# Patient Record
Sex: FEMALE | Race: ASIAN | NOT HISPANIC OR LATINO | URBAN - METROPOLITAN AREA
[De-identification: names, ages, dates, MRNs, and addresses within clinical notes are randomized per-mention and may not be internally consistent; named-entity substitution may affect disease eponyms.]

---

## 2021-07-17 ENCOUNTER — INPATIENT (INPATIENT)
Facility: HOSPITAL | Age: 86
LOS: 1 days | Discharge: HOME | End: 2021-07-19
Attending: INTERNAL MEDICINE | Admitting: INTERNAL MEDICINE
Payer: MEDICARE

## 2021-07-17 VITALS
SYSTOLIC BLOOD PRESSURE: 164 MMHG | DIASTOLIC BLOOD PRESSURE: 90 MMHG | HEART RATE: 98 BPM | RESPIRATION RATE: 86 BRPM | OXYGEN SATURATION: 98 % | TEMPERATURE: 98 F

## 2021-07-17 DIAGNOSIS — K21.9 GASTRO-ESOPHAGEAL REFLUX DISEASE WITHOUT ESOPHAGITIS: ICD-10-CM

## 2021-07-17 DIAGNOSIS — R79.89 OTHER SPECIFIED ABNORMAL FINDINGS OF BLOOD CHEMISTRY: ICD-10-CM

## 2021-07-17 DIAGNOSIS — R07.9 CHEST PAIN, UNSPECIFIED: ICD-10-CM

## 2021-07-17 DIAGNOSIS — I20.8 OTHER FORMS OF ANGINA PECTORIS: ICD-10-CM

## 2021-07-17 DIAGNOSIS — Z29.9 ENCOUNTER FOR PROPHYLACTIC MEASURES, UNSPECIFIED: ICD-10-CM

## 2021-07-17 DIAGNOSIS — Z96.659 PRESENCE OF UNSPECIFIED ARTIFICIAL KNEE JOINT: Chronic | ICD-10-CM

## 2021-07-17 DIAGNOSIS — E11.9 TYPE 2 DIABETES MELLITUS WITHOUT COMPLICATIONS: ICD-10-CM

## 2021-07-17 DIAGNOSIS — I10 ESSENTIAL (PRIMARY) HYPERTENSION: ICD-10-CM

## 2021-07-17 DIAGNOSIS — E78.5 HYPERLIPIDEMIA, UNSPECIFIED: ICD-10-CM

## 2021-07-17 LAB
ALBUMIN SERPL ELPH-MCNC: 4.6 G/DL — SIGNIFICANT CHANGE UP (ref 3.3–5)
ALP SERPL-CCNC: 79 U/L — SIGNIFICANT CHANGE UP (ref 40–120)
ALT FLD-CCNC: 57 U/L — HIGH (ref 4–33)
ANION GAP SERPL CALC-SCNC: 16 MMOL/L — HIGH (ref 7–14)
AST SERPL-CCNC: 51 U/L — HIGH (ref 4–32)
BASOPHILS # BLD AUTO: 0.04 K/UL — SIGNIFICANT CHANGE UP (ref 0–0.2)
BASOPHILS NFR BLD AUTO: 0.4 % — SIGNIFICANT CHANGE UP (ref 0–2)
BILIRUB SERPL-MCNC: 0.2 MG/DL — SIGNIFICANT CHANGE UP (ref 0.2–1.2)
BUN SERPL-MCNC: 20 MG/DL — SIGNIFICANT CHANGE UP (ref 7–23)
CALCIUM SERPL-MCNC: 10.2 MG/DL — SIGNIFICANT CHANGE UP (ref 8.4–10.5)
CHLORIDE SERPL-SCNC: 97 MMOL/L — LOW (ref 98–107)
CO2 SERPL-SCNC: 23 MMOL/L — SIGNIFICANT CHANGE UP (ref 22–31)
CREAT SERPL-MCNC: 0.92 MG/DL — SIGNIFICANT CHANGE UP (ref 0.5–1.3)
EOSINOPHIL # BLD AUTO: 0.11 K/UL — SIGNIFICANT CHANGE UP (ref 0–0.5)
EOSINOPHIL NFR BLD AUTO: 1 % — SIGNIFICANT CHANGE UP (ref 0–6)
GLUCOSE SERPL-MCNC: 135 MG/DL — HIGH (ref 70–99)
HCT VFR BLD CALC: 39.5 % — SIGNIFICANT CHANGE UP (ref 34.5–45)
HGB BLD-MCNC: 13 G/DL — SIGNIFICANT CHANGE UP (ref 11.5–15.5)
IANC: 6.55 K/UL — SIGNIFICANT CHANGE UP (ref 1.5–8.5)
IMM GRANULOCYTES NFR BLD AUTO: 0.5 % — SIGNIFICANT CHANGE UP (ref 0–1.5)
LYMPHOCYTES # BLD AUTO: 2.97 K/UL — SIGNIFICANT CHANGE UP (ref 1–3.3)
LYMPHOCYTES # BLD AUTO: 28 % — SIGNIFICANT CHANGE UP (ref 13–44)
MAGNESIUM SERPL-MCNC: 2.1 MG/DL — SIGNIFICANT CHANGE UP (ref 1.6–2.6)
MCHC RBC-ENTMCNC: 29.1 PG — SIGNIFICANT CHANGE UP (ref 27–34)
MCHC RBC-ENTMCNC: 32.9 GM/DL — SIGNIFICANT CHANGE UP (ref 32–36)
MCV RBC AUTO: 88.4 FL — SIGNIFICANT CHANGE UP (ref 80–100)
MONOCYTES # BLD AUTO: 0.88 K/UL — SIGNIFICANT CHANGE UP (ref 0–0.9)
MONOCYTES NFR BLD AUTO: 8.3 % — SIGNIFICANT CHANGE UP (ref 2–14)
NEUTROPHILS # BLD AUTO: 6.55 K/UL — SIGNIFICANT CHANGE UP (ref 1.8–7.4)
NEUTROPHILS NFR BLD AUTO: 61.8 % — SIGNIFICANT CHANGE UP (ref 43–77)
NRBC # BLD: 0 /100 WBCS — SIGNIFICANT CHANGE UP
NRBC # FLD: 0 K/UL — SIGNIFICANT CHANGE UP
NT-PROBNP SERPL-SCNC: 79 PG/ML — SIGNIFICANT CHANGE UP
PLATELET # BLD AUTO: 245 K/UL — SIGNIFICANT CHANGE UP (ref 150–400)
POTASSIUM SERPL-MCNC: 4.6 MMOL/L — SIGNIFICANT CHANGE UP (ref 3.5–5.3)
POTASSIUM SERPL-SCNC: 4.6 MMOL/L — SIGNIFICANT CHANGE UP (ref 3.5–5.3)
PROT SERPL-MCNC: 8 G/DL — SIGNIFICANT CHANGE UP (ref 6–8.3)
RBC # BLD: 4.47 M/UL — SIGNIFICANT CHANGE UP (ref 3.8–5.2)
RBC # FLD: 13.1 % — SIGNIFICANT CHANGE UP (ref 10.3–14.5)
SARS-COV-2 RNA SPEC QL NAA+PROBE: SIGNIFICANT CHANGE UP
SODIUM SERPL-SCNC: 136 MMOL/L — SIGNIFICANT CHANGE UP (ref 135–145)
TROPONIN T, HIGH SENSITIVITY RESULT: 15 NG/L — SIGNIFICANT CHANGE UP
TROPONIN T, HIGH SENSITIVITY RESULT: 16 NG/L — SIGNIFICANT CHANGE UP
WBC # BLD: 10.6 K/UL — HIGH (ref 3.8–10.5)
WBC # FLD AUTO: 10.6 K/UL — HIGH (ref 3.8–10.5)

## 2021-07-17 PROCEDURE — 93010 ELECTROCARDIOGRAM REPORT: CPT

## 2021-07-17 PROCEDURE — 99285 EMERGENCY DEPT VISIT HI MDM: CPT | Mod: CS,25

## 2021-07-17 PROCEDURE — 71045 X-RAY EXAM CHEST 1 VIEW: CPT | Mod: 26

## 2021-07-17 PROCEDURE — 99223 1ST HOSP IP/OBS HIGH 75: CPT

## 2021-07-17 RX ORDER — GLUCAGON INJECTION, SOLUTION 0.5 MG/.1ML
1 INJECTION, SOLUTION SUBCUTANEOUS ONCE
Refills: 0 | Status: DISCONTINUED | OUTPATIENT
Start: 2021-07-17 | End: 2021-07-19

## 2021-07-17 RX ORDER — DEXTROSE 50 % IN WATER 50 %
12.5 SYRINGE (ML) INTRAVENOUS ONCE
Refills: 0 | Status: DISCONTINUED | OUTPATIENT
Start: 2021-07-17 | End: 2021-07-19

## 2021-07-17 RX ORDER — HYDROCHLOROTHIAZIDE 25 MG
25 TABLET ORAL DAILY
Refills: 0 | Status: DISCONTINUED | OUTPATIENT
Start: 2021-07-17 | End: 2021-07-19

## 2021-07-17 RX ORDER — HYDRALAZINE HCL 50 MG
1 TABLET ORAL
Qty: 0 | Refills: 0 | DISCHARGE

## 2021-07-17 RX ORDER — LOVASTATIN 20 MG
1 TABLET ORAL
Qty: 0 | Refills: 0 | DISCHARGE

## 2021-07-17 RX ORDER — VALSARTAN 80 MG/1
160 TABLET ORAL DAILY
Refills: 0 | Status: DISCONTINUED | OUTPATIENT
Start: 2021-07-17 | End: 2021-07-19

## 2021-07-17 RX ORDER — METOPROLOL TARTRATE 50 MG
25 TABLET ORAL DAILY
Refills: 0 | Status: DISCONTINUED | OUTPATIENT
Start: 2021-07-17 | End: 2021-07-19

## 2021-07-17 RX ORDER — METFORMIN HYDROCHLORIDE 850 MG/1
1 TABLET ORAL
Qty: 0 | Refills: 0 | DISCHARGE

## 2021-07-17 RX ORDER — ACETAMINOPHEN 500 MG
650 TABLET ORAL EVERY 6 HOURS
Refills: 0 | Status: DISCONTINUED | OUTPATIENT
Start: 2021-07-17 | End: 2021-07-19

## 2021-07-17 RX ORDER — PANTOPRAZOLE SODIUM 20 MG/1
40 TABLET, DELAYED RELEASE ORAL
Refills: 0 | Status: DISCONTINUED | OUTPATIENT
Start: 2021-07-17 | End: 2021-07-19

## 2021-07-17 RX ORDER — SODIUM CHLORIDE 9 MG/ML
1000 INJECTION, SOLUTION INTRAVENOUS
Refills: 0 | Status: DISCONTINUED | OUTPATIENT
Start: 2021-07-17 | End: 2021-07-19

## 2021-07-17 RX ORDER — METOPROLOL TARTRATE 50 MG
1 TABLET ORAL
Qty: 0 | Refills: 0 | DISCHARGE

## 2021-07-17 RX ORDER — ATORVASTATIN CALCIUM 80 MG/1
20 TABLET, FILM COATED ORAL AT BEDTIME
Refills: 0 | Status: DISCONTINUED | OUTPATIENT
Start: 2021-07-17 | End: 2021-07-19

## 2021-07-17 RX ORDER — DEXTROSE 50 % IN WATER 50 %
15 SYRINGE (ML) INTRAVENOUS ONCE
Refills: 0 | Status: DISCONTINUED | OUTPATIENT
Start: 2021-07-17 | End: 2021-07-19

## 2021-07-17 RX ORDER — HYDRALAZINE HCL 50 MG
50 TABLET ORAL
Refills: 0 | Status: DISCONTINUED | OUTPATIENT
Start: 2021-07-17 | End: 2021-07-19

## 2021-07-17 RX ORDER — ENOXAPARIN SODIUM 100 MG/ML
40 INJECTION SUBCUTANEOUS DAILY
Refills: 0 | Status: DISCONTINUED | OUTPATIENT
Start: 2021-07-17 | End: 2021-07-19

## 2021-07-17 RX ORDER — ASPIRIN/CALCIUM CARB/MAGNESIUM 324 MG
324 TABLET ORAL ONCE
Refills: 0 | Status: COMPLETED | OUTPATIENT
Start: 2021-07-17 | End: 2021-07-17

## 2021-07-17 RX ORDER — ASPIRIN/CALCIUM CARB/MAGNESIUM 324 MG
81 TABLET ORAL DAILY
Refills: 0 | Status: DISCONTINUED | OUTPATIENT
Start: 2021-07-17 | End: 2021-07-19

## 2021-07-17 RX ORDER — INSULIN LISPRO 100/ML
VIAL (ML) SUBCUTANEOUS
Refills: 0 | Status: DISCONTINUED | OUTPATIENT
Start: 2021-07-17 | End: 2021-07-19

## 2021-07-17 RX ORDER — DEXTROSE 50 % IN WATER 50 %
25 SYRINGE (ML) INTRAVENOUS ONCE
Refills: 0 | Status: DISCONTINUED | OUTPATIENT
Start: 2021-07-17 | End: 2021-07-19

## 2021-07-17 RX ORDER — OMEPRAZOLE 10 MG/1
1 CAPSULE, DELAYED RELEASE ORAL
Qty: 0 | Refills: 0 | DISCHARGE

## 2021-07-17 RX ORDER — ACETAMINOPHEN 500 MG
650 TABLET ORAL ONCE
Refills: 0 | Status: COMPLETED | OUTPATIENT
Start: 2021-07-17 | End: 2021-07-17

## 2021-07-17 RX ADMIN — ATORVASTATIN CALCIUM 20 MILLIGRAM(S): 80 TABLET, FILM COATED ORAL at 20:01

## 2021-07-17 RX ADMIN — Medication 324 MILLIGRAM(S): at 12:59

## 2021-07-17 RX ADMIN — Medication 650 MILLIGRAM(S): at 12:59

## 2021-07-17 RX ADMIN — Medication 50 MILLIGRAM(S): at 19:06

## 2021-07-17 NOTE — ED PROVIDER NOTE - ATTENDING CONTRIBUTION TO CARE
I performed a history and physical exam of the patient and discussed their management with the resident and /or advanced care provider. I reviewed the resident and /or ACP's note and agree with the documented findings and plan of care. My medical decision making and observations are found above.  Lungs clear, abd distended, non tender.

## 2021-07-17 NOTE — ED PROVIDER NOTE - CLINICAL SUMMARY MEDICAL DECISION MAKING FREE TEXT BOX
Cassandra: chest pain this am. No MI hx. EKG ok. will check troponin. Alos hx of HA and dizziness with standing for months. Will check DM and Cr as well as hct for cause of weakness (general) and pain. Age makes her higher risk. Cassandra: chest pain this am. No MI hx. EKG ok. will check troponin. Alos hx of HA and dizziness with standing for months. Will check DM and Cr as well as hct for cause of weakness (general) and pain. Age makes her higher risk.    Alex: Pt is a 85 y/o female c/o 4 hour history of 8/10, sharp substernal chest pain with radiation to the left axillary region. Pt also has a history of headaches and dizziness with standing for 4 months. Trops and BNP workup to rule out cardiac causes of chest pain.

## 2021-07-17 NOTE — ED ADULT NURSE NOTE - OBJECTIVE STATEMENT
pt alert,oriented x 3.  Bulgarian speaking as per son  pt c/o ch pains since this am. reports diff breathing upon changing position. denies cough,fever. iv access,labs sent. will continue to monitor.

## 2021-07-17 NOTE — H&P ADULT - NSHPREVIEWOFSYSTEMS_GEN_ALL_CORE
CONSTITUTIONAL: No fever, weight loss, or fatigue  EYES: No eye pain, visual disturbances, or discharge  ENMT:  No difficulty hearing, tinnitus, vertigo; No sinus or throat pain  NECK: No pain or stiffness  BREASTS: No pain, masses, or nipple discharge  RESPIRATORY: No cough, wheezing, chills or hemoptysis; + shortness of breath  CARDIOVASCULAR: + chest pain, no palpitations, + dizziness, no leg swelling  GASTROINTESTINAL: No abdominal or epigastric pain. No nausea, vomiting, or hematemesis; No diarrhea or constipation. No melena or hematochezia.  GENITOURINARY: No dysuria, frequency, hematuria, or incontinence  NEUROLOGICAL: No headaches, memory loss, loss of strength, numbness, or tremors  SKIN: No itching, burning, rashes, or lesions   LYMPH NODES: No enlarged glands  ENDOCRINE: No heat or cold intolerance; No hair loss  MUSCULOSKELETAL: No muscle or back pain, +arthritis  PSYCHIATRIC: No depression, anxiety, mood swings, or difficulty sleeping  HEME/LYMPH: No easy bruising, or bleeding gums  ALLERGY AND IMMUNOLOGIC: No hives or eczema

## 2021-07-17 NOTE — ED ADULT TRIAGE NOTE - CHIEF COMPLAINT QUOTE
pt c/o chest pain x 1 day, radiating to the back. denies SOB. #22s/l placed to R wrist and 325 asa given by EMS. pt dm2

## 2021-07-17 NOTE — H&P ADULT - PROBLEM SELECTOR PLAN 5
c/w statin , takes lovastatin 10mg hs at home  lipitor here mild transaminitis likely d/t fatty liver (h/o hld), AST/ALT 51/57  no RUQ abd pain, T. bili normal  trend LFT, outpt f/u PCP

## 2021-07-17 NOTE — H&P ADULT - HISTORY OF PRESENT ILLNESS
Patient is a 87 y/o Wolof speaking female with h/o HTN, DM-2, HLD, GERD, who presents with left sided substernal chest pain this morning, associated with sob, some radiation to left axillary region. Patient denies fever/chill/ abd pain/urinary symptoms/cough/nausea/vomiting/diaphoresis. Chest pain lasted for about 30 minutes. Also c/o dizziness for the past few days.   In ED, EKG LVH, no ischemic ST/T changes.  History obtained from patient with the help of her son at bedside.

## 2021-07-17 NOTE — ED ADULT NURSE NOTE - NSIMPLEMENTINTERV_GEN_ALL_ED
Implemented All Fall with Harm Risk Interventions:  [place] to call system. Call bell, personal items and telephone within reach. Instruct patient to call for assistance. Room bathroom lighting operational. Non-slip footwear when patient is off stretcher. Physically safe environment: no spills, clutter or unnecessary equipment. Stretcher in lowest position, wheels locked, appropriate side rails in place. Provide visual cue, wrist band, yellow gown, etc. Monitor gait and stability. Monitor for mental status changes and reorient to person, place, and time. Review medications for side effects contributing to fall risk. Reinforce activity limits and safety measures with patient and family. Provide visual clues: red socks.

## 2021-07-17 NOTE — ED PROVIDER NOTE - OBJECTIVE STATEMENT
Pt is a 87 y/o female c/o 4 hour history of 8/10, sharp substernal chest pain with radiation to the left axillary region. Pt was waking up when the pain began. Pt has a history of headaches and vertigo. Denies fevers, chills, nausea, vomiting, diarrhea, constipation, and abdominal pain.

## 2021-07-17 NOTE — H&P ADULT - NSICDXPASTMEDICALHX_GEN_ALL_CORE_FT
PAST MEDICAL HISTORY:  Diabetes mellitus     GERD (gastroesophageal reflux disease)     HLD (hyperlipidemia)     HTN (hypertension)     Osteoarthritis

## 2021-07-17 NOTE — H&P ADULT - NSHPLABSRESULTS_GEN_ALL_CORE
LABS:                        13.0   10.60 )-----------( 245      ( 17 Jul 2021 12:52 )             39.5     07-17    136  |  97<L>  |  20  ----------------------------<  135<H>  4.6   |  23  |  0.92    Ca    10.2      17 Jul 2021 12:53  Phos  2.6     07-17  Mg     2.10     07-17    TPro  8.0  /  Alb  4.6  /  TBili  0.2  /  DBili  x   /  AST  51<H>  /  ALT  57<H>  /  AlkPhos  79  07-17      EKG reviewed: NSR, LVH, no ischemic ST/T change      RADIOLOGY & ADDITIONAL TESTS:  < from: Xray Chest 1 View- PORTABLE-Urgent (07.17.21 @ 12:33) >    IMPRESSION:  Elevated right hemidiaphragm.    Clear lungs. No pleural effusions or pneumothorax.    Slightly prominent appearing cardiac and mediastinal silhouettes however inaccurately assessed on the projection. Scant aortic arch calcifications.    Trachea midline.    Generalized mild osteopenia.

## 2021-07-17 NOTE — H&P ADULT - PROBLEM/PLAN-1
----- Message from Ele Lau sent at 10/9/2020  1:53 PM CDT -----  Regarding: PT  Contact: PT's Mother Cinda  PT's mother called to see if the doctor had anything available [facility]. The PT has a doctor on the [facility] but they're booked out for a long time and her father sees Dr Mulligan so her mother decided to call and see if maybe she could see her too. Please call back     Callback: [phone]     
Scheduled appt with dipp  
DISPLAY PLAN FREE TEXT

## 2021-07-17 NOTE — ED PROVIDER NOTE - NS ED ROS FT
CONSTITUTIONAL: No fever, weight loss, or fatigue  EYES: No eye pain, visual disturbances, or discharge  ENMT:  +Headaches, No difficulty hearing, tinnitus, vertigo; No sinus or throat pain  NECK: No pain or stiffness  RESPIRATORY: +Shortness of breath, No cough, wheezing, chills or hemoptysis  CARDIOVASCULAR: + Chest pain, palpitations, dizziness, or leg swelling  GASTROINTESTINAL: No abdominal or epigastric pain. No nausea, vomiting, or hematemesis; No diarrhea or constipation. No melena or hematochezia.  GENITOURINARY: No dysuria, frequency, hematuria, or incontinence  NEUROLOGICAL: No headaches, memory loss, loss of strength, numbness, or tremors  SKIN: No itching, burning, rashes, or lesions

## 2021-07-17 NOTE — H&P ADULT - NSHPPHYSICALEXAM_GEN_ALL_CORE
Vital Signs Last 24 Hrs  T(C): 36.7 (17 Jul 2021 11:31), Max: 36.7 (17 Jul 2021 11:31)  T(F): 98 (17 Jul 2021 11:31), Max: 98 (17 Jul 2021 11:31)  HR: 70 (17 Jul 2021 15:20) (70 - 98)  BP: 146/79 (17 Jul 2021 15:20) (146/79 - 164/90)  BP(mean): --  RR: 18 (17 Jul 2021 15:20) (16 - 18)  SpO2: 100% (17 Jul 2021 12:27) (98% - 100%)  CAPILLARY BLOOD GLUCOSE      POCT Blood Glucose.: 176 mg/dL (17 Jul 2021 11:34)    I&O's Summary      PHYSICAL EXAM:  GENERAL: NAD, well-developed  HEAD:  Atraumatic, Normocephalic  EYES: EOMI, conjunctiva and sclera clear  NECK: Supple, No JVD  CHEST/LUNG: Clear to auscultation bilaterally; No wheeze  HEART: Regular rate and rhythm; No murmurs, rubs, or gallops  ABDOMEN: Soft, Nontender, Nondistended; Bowel sounds present  EXTREMITIES:  2+ Peripheral Pulses, No clubbing, cyanosis, or edema, surgical scars from b/l TKR  PSYCH: AAOx3  NEUROLOGY: non-focal  SKIN: No rashes or lesions

## 2021-07-17 NOTE — H&P ADULT - PROBLEM SELECTOR PLAN 1
-substernal chest pain with sob, dizziness, left axilla region radiation concerning for underlying ischemic heart disease  -EKG LVH, NSR, no ischemic ST/T changes  -ruled out for ACS with negative troponins x2 (15--16), pro-BNP nml 79  -check TTE  -consider ischemic eval with stress test (inpt vs. outpt)  -start ASA, c/w statin, beta blocker  -I notified cardiologist Dr. West who will see him -substernal chest pain with sob, dizziness, left axilla region radiation concerning for underlying ischemic heart disease  -EKG LVH, NSR, no ischemic ST/T changes  -ruled out for ACS with negative troponins x2 (15--16), pro-BNP nml 79  -check TTE  -consider ischemic eval with stress test (inpt vs. outpt)  -start ASA, c/w statin, beta blocker  -I notified cardiologist Dr. West who will see her

## 2021-07-18 LAB
A1C WITH ESTIMATED AVERAGE GLUCOSE RESULT: 7.3 % — HIGH (ref 4–5.6)
ALBUMIN SERPL ELPH-MCNC: 4.1 G/DL — SIGNIFICANT CHANGE UP (ref 3.3–5)
ALP SERPL-CCNC: 67 U/L — SIGNIFICANT CHANGE UP (ref 40–120)
ALT FLD-CCNC: 50 U/L — HIGH (ref 4–33)
ANION GAP SERPL CALC-SCNC: 16 MMOL/L — HIGH (ref 7–14)
AST SERPL-CCNC: 39 U/L — HIGH (ref 4–32)
BILIRUB DIRECT SERPL-MCNC: <0.2 MG/DL — SIGNIFICANT CHANGE UP (ref 0–0.2)
BILIRUB INDIRECT FLD-MCNC: >0.2 MG/DL — SIGNIFICANT CHANGE UP (ref 0–1)
BILIRUB SERPL-MCNC: 0.4 MG/DL — SIGNIFICANT CHANGE UP (ref 0.2–1.2)
BUN SERPL-MCNC: 18 MG/DL — SIGNIFICANT CHANGE UP (ref 7–23)
CALCIUM SERPL-MCNC: 9.9 MG/DL — SIGNIFICANT CHANGE UP (ref 8.4–10.5)
CHLORIDE SERPL-SCNC: 97 MMOL/L — LOW (ref 98–107)
CO2 SERPL-SCNC: 23 MMOL/L — SIGNIFICANT CHANGE UP (ref 22–31)
COVID-19 SPIKE DOMAIN AB INTERP: POSITIVE
COVID-19 SPIKE DOMAIN ANTIBODY RESULT: >250 U/ML — HIGH
CREAT SERPL-MCNC: 0.89 MG/DL — SIGNIFICANT CHANGE UP (ref 0.5–1.3)
ESTIMATED AVERAGE GLUCOSE: 163 — SIGNIFICANT CHANGE UP
GLUCOSE SERPL-MCNC: 145 MG/DL — HIGH (ref 70–99)
HCT VFR BLD CALC: 38.6 % — SIGNIFICANT CHANGE UP (ref 34.5–45)
HGB BLD-MCNC: 12.8 G/DL — SIGNIFICANT CHANGE UP (ref 11.5–15.5)
MCHC RBC-ENTMCNC: 28.7 PG — SIGNIFICANT CHANGE UP (ref 27–34)
MCHC RBC-ENTMCNC: 33.2 GM/DL — SIGNIFICANT CHANGE UP (ref 32–36)
MCV RBC AUTO: 86.5 FL — SIGNIFICANT CHANGE UP (ref 80–100)
NRBC # BLD: 0 /100 WBCS — SIGNIFICANT CHANGE UP
NRBC # FLD: 0 K/UL — SIGNIFICANT CHANGE UP
PLATELET # BLD AUTO: 233 K/UL — SIGNIFICANT CHANGE UP (ref 150–400)
POTASSIUM SERPL-MCNC: 3.7 MMOL/L — SIGNIFICANT CHANGE UP (ref 3.5–5.3)
POTASSIUM SERPL-SCNC: 3.7 MMOL/L — SIGNIFICANT CHANGE UP (ref 3.5–5.3)
PROT SERPL-MCNC: 7.5 G/DL — SIGNIFICANT CHANGE UP (ref 6–8.3)
RBC # BLD: 4.46 M/UL — SIGNIFICANT CHANGE UP (ref 3.8–5.2)
RBC # FLD: 13.1 % — SIGNIFICANT CHANGE UP (ref 10.3–14.5)
SARS-COV-2 IGG+IGM SERPL QL IA: >250 U/ML — HIGH
SARS-COV-2 IGG+IGM SERPL QL IA: POSITIVE
SODIUM SERPL-SCNC: 136 MMOL/L — SIGNIFICANT CHANGE UP (ref 135–145)
TROPONIN T, HIGH SENSITIVITY RESULT: 14 NG/L — SIGNIFICANT CHANGE UP
WBC # BLD: 9.42 K/UL — SIGNIFICANT CHANGE UP (ref 3.8–10.5)
WBC # FLD AUTO: 9.42 K/UL — SIGNIFICANT CHANGE UP (ref 3.8–10.5)

## 2021-07-18 RX ADMIN — ENOXAPARIN SODIUM 40 MILLIGRAM(S): 100 INJECTION SUBCUTANEOUS at 11:09

## 2021-07-18 RX ADMIN — Medication 25 MILLIGRAM(S): at 05:59

## 2021-07-18 RX ADMIN — Medication 81 MILLIGRAM(S): at 11:08

## 2021-07-18 RX ADMIN — ATORVASTATIN CALCIUM 20 MILLIGRAM(S): 80 TABLET, FILM COATED ORAL at 21:10

## 2021-07-18 RX ADMIN — VALSARTAN 160 MILLIGRAM(S): 80 TABLET ORAL at 05:58

## 2021-07-18 RX ADMIN — PANTOPRAZOLE SODIUM 40 MILLIGRAM(S): 20 TABLET, DELAYED RELEASE ORAL at 05:59

## 2021-07-18 RX ADMIN — Medication 1 TABLET(S): at 11:08

## 2021-07-18 RX ADMIN — Medication 50 MILLIGRAM(S): at 18:13

## 2021-07-18 RX ADMIN — Medication 50 MILLIGRAM(S): at 05:58

## 2021-07-18 RX ADMIN — Medication 1: at 12:42

## 2021-07-18 NOTE — CONSULT NOTE ADULT - TIME BILLING
Patient seen and examined.  Agree with above NP note.   85 y/o Irish speaking female with h/o HTN, DM-2, HLD, GERD, who presents with left sided substernal chest pain, sob     #chest pain   -on acs, check echo   -+ cad risk factors, plan for stress test   -cont asa, statin     #dyspnea, some edema  -check echo  -lasix 20 mg IVP x 1     # htn  -c/w meds     dvt ppx

## 2021-07-18 NOTE — CONSULT NOTE ADULT - ASSESSMENT
85 y/o Georgian speaking female with h/o HTN, DM-2, HLD, GERD, who presents with left sided substernal chest pain, sob     # chest pain   -no acs, HS trop neg, chest xray unremarkable   -check echo   -+ cad risk factors, plan for stress test   - asa, statin     #SOB   -trace edema on exam, appear tachypneic on exam   -check echo  -stop hctz  -give lasix 20 mg IVP x 1 now     # htn  -c/w meds     med eval    dvt ppx

## 2021-07-19 VITALS — DIASTOLIC BLOOD PRESSURE: 77 MMHG | HEART RATE: 89 BPM | SYSTOLIC BLOOD PRESSURE: 144 MMHG

## 2021-07-19 LAB
ANION GAP SERPL CALC-SCNC: 15 MMOL/L — HIGH (ref 7–14)
BUN SERPL-MCNC: 18 MG/DL — SIGNIFICANT CHANGE UP (ref 7–23)
CALCIUM SERPL-MCNC: 9.7 MG/DL — SIGNIFICANT CHANGE UP (ref 8.4–10.5)
CHLORIDE SERPL-SCNC: 96 MMOL/L — LOW (ref 98–107)
CO2 SERPL-SCNC: 24 MMOL/L — SIGNIFICANT CHANGE UP (ref 22–31)
CREAT SERPL-MCNC: 0.92 MG/DL — SIGNIFICANT CHANGE UP (ref 0.5–1.3)
GLUCOSE SERPL-MCNC: 126 MG/DL — HIGH (ref 70–99)
MAGNESIUM SERPL-MCNC: 1.8 MG/DL — SIGNIFICANT CHANGE UP (ref 1.6–2.6)
PHOSPHATE SERPL-MCNC: 3.6 MG/DL — SIGNIFICANT CHANGE UP (ref 2.5–4.5)
POTASSIUM SERPL-MCNC: 3.6 MMOL/L — SIGNIFICANT CHANGE UP (ref 3.5–5.3)
POTASSIUM SERPL-SCNC: 3.6 MMOL/L — SIGNIFICANT CHANGE UP (ref 3.5–5.3)
SODIUM SERPL-SCNC: 135 MMOL/L — SIGNIFICANT CHANGE UP (ref 135–145)

## 2021-07-19 PROCEDURE — 93306 TTE W/DOPPLER COMPLETE: CPT | Mod: 26

## 2021-07-19 PROCEDURE — 78452 HT MUSCLE IMAGE SPECT MULT: CPT | Mod: 26

## 2021-07-19 PROCEDURE — 93018 CV STRESS TEST I&R ONLY: CPT | Mod: GC

## 2021-07-19 PROCEDURE — 93016 CV STRESS TEST SUPVJ ONLY: CPT | Mod: GC

## 2021-07-19 RX ORDER — ASPIRIN/CALCIUM CARB/MAGNESIUM 324 MG
1 TABLET ORAL
Qty: 30 | Refills: 0
Start: 2021-07-19 | End: 2021-08-17

## 2021-07-19 RX ORDER — FUROSEMIDE 40 MG
20 TABLET ORAL DAILY
Refills: 0 | Status: DISCONTINUED | OUTPATIENT
Start: 2021-07-19 | End: 2021-07-19

## 2021-07-19 RX ORDER — ACETAMINOPHEN 500 MG
2 TABLET ORAL
Qty: 0 | Refills: 0 | DISCHARGE
Start: 2021-07-19

## 2021-07-19 RX ADMIN — Medication 1 TABLET(S): at 12:21

## 2021-07-19 RX ADMIN — VALSARTAN 160 MILLIGRAM(S): 80 TABLET ORAL at 05:25

## 2021-07-19 RX ADMIN — PANTOPRAZOLE SODIUM 40 MILLIGRAM(S): 20 TABLET, DELAYED RELEASE ORAL at 05:26

## 2021-07-19 RX ADMIN — Medication 1: at 12:22

## 2021-07-19 RX ADMIN — Medication 50 MILLIGRAM(S): at 05:25

## 2021-07-19 RX ADMIN — Medication 25 MILLIGRAM(S): at 05:26

## 2021-07-19 RX ADMIN — Medication 25 MILLIGRAM(S): at 05:25

## 2021-07-19 RX ADMIN — ENOXAPARIN SODIUM 40 MILLIGRAM(S): 100 INJECTION SUBCUTANEOUS at 12:21

## 2021-07-19 RX ADMIN — Medication 81 MILLIGRAM(S): at 12:21

## 2021-07-19 RX ADMIN — Medication 50 MILLIGRAM(S): at 18:19

## 2021-07-19 NOTE — PROGRESS NOTE ADULT - TIME BILLING
Patient seen and examined.  Agree with above NP note.  CV stable  followup echo and stress  start low dose lasix  cont asa, statin   dvt ppx

## 2021-07-19 NOTE — DISCHARGE NOTE PROVIDER - NSDCCPCAREPLAN_GEN_ALL_CORE_FT
PRINCIPAL DISCHARGE DIAGNOSIS  Diagnosis: Chest pain  Assessment and Plan of Treatment: You had an echocardiogram and a nuclear stress test on 7/19/21 which were both unremarkable. Please follow up outpatient with Dr. West in 1 week. Please resume your home cardiac medications. Please take Aspirin 81mg daily orally.

## 2021-07-19 NOTE — DISCHARGE NOTE PROVIDER - NSDCMRMEDTOKEN_GEN_ALL_CORE_FT
acetaminophen 325 mg oral tablet: 2 tab(s) orally every 6 hours, As needed, Temp greater or equal to 38.5C (101.3F), Mild Pain (1 - 3)  aspirin 81 mg oral tablet, chewable: 1 tab(s) orally once a day  Calcium 500+D oral tablet, chewable: 1 tab(s) orally once a day  hydrALAZINE 50 mg oral tablet: 1 tab(s) orally 2 times a day  lovastatin 10 mg oral tablet: 1 tab(s) orally once a day (at bedtime)  metFORMIN 500 mg oral tablet: 1 tab(s) orally 2 times a day  metoprolol succinate 25 mg oral tablet, extended release: 1 tab(s) orally once a day  omeprazole 20 mg oral delayed release capsule: 1 cap(s) orally once a day  valsartan-hydrochlorothiazide 160mg-25mg oral tablet: 1 tab(s) orally once a day

## 2021-07-19 NOTE — DISCHARGE NOTE NURSING/CASE MANAGEMENT/SOCIAL WORK - NSDCFUADDAPPT_GEN_ALL_CORE_FT
If you are in need of a general medicine physician and post-discharge medical follow-up for further care/recommendations you may contact the [facility] for an appointment [phone]

## 2021-07-19 NOTE — DISCHARGE NOTE NURSING/CASE MANAGEMENT/SOCIAL WORK - PATIENT PORTAL LINK FT
You can access the FollowMyHealth Patient Portal offered by [facility] by registering at the following website: [email or web address]. By joining [facility]’s FollowMyHealth portal, you will also be able to view your health information using other applications (apps) compatible with our system.

## 2021-07-19 NOTE — DISCHARGE NOTE PROVIDER - CARE PROVIDER_API CALL
Riley West)  Cardiology  [address], [address]  [place] [address]  Phone: [phone]  Fax: [phone]  Follow Up Time: 1 week

## 2021-07-19 NOTE — PROGRESS NOTE ADULT - ASSESSMENT
85 y/o Yakut speaking female with h/o HTN, DM-2, HLD, GERD, who presents with left sided substernal chest pain, sob     # chest pain   -no acs, HS trop neg, chest xray unremarkable   -check echo   -+ cad risk factors, plan for stress test   - asa, statin     #SOB   -mildly overloaded on exam   -check echo  -stop hctz  -start lasix 20 mg IVP     # htn  -c/w meds     med eval    dvt ppx

## 2021-07-19 NOTE — PROGRESS NOTE ADULT - SUBJECTIVE AND OBJECTIVE BOX
CARDIOLOGY FOLLOW UP - Dr. West  DATE OF SERVICE: 7/19/21     CC no acute events       REVIEW OF SYSTEMS:  CONSTITUTIONAL: No fever, weight loss, or fatigue  RESPIRATORY: No cough, wheezing, chills or hemoptysis; No Shortness of Breath  CARDIOVASCULAR: No chest pain, palpitations, passing out, dizziness, or leg swelling  GASTROINTESTINAL: No abdominal or epigastric pain. No nausea, vomiting, or hematemesis; No diarrhea or constipation. No melena or hematochezia.  VASCULAR: No edema     PHYSICAL EXAM:  T(C): 36.6 (07-19-21 @ 05:20), Max: 36.7 (07-18-21 @ 21:05)  HR: 71 (07-19-21 @ 05:20) (71 - 80)  BP: 129/73 (07-19-21 @ 05:20) (129/73 - 169/85)  RR: 17 (07-19-21 @ 05:20) (17 - 18)  SpO2: 99% (07-19-21 @ 05:20) (99% - 100%)  Wt(kg): --  I&O's Summary      Appearance: Normal	  Cardiovascular: Normal S1 S2,RRR, No JVD, No murmurs  Respiratory:crackles   Gastrointestinal:  Soft, Non-tender, + BS	  Extremities:  trace  edema      Home Medications:  Calcium 500+D oral tablet, chewable: 1 tab(s) orally once a day (17 Jul 2021 17:53)  hydrALAZINE 50 mg oral tablet: 1 tab(s) orally 2 times a day (17 Jul 2021 17:53)  lovastatin 10 mg oral tablet: 1 tab(s) orally once a day (at bedtime) (17 Jul 2021 17:53)  metFORMIN 500 mg oral tablet: 1 tab(s) orally 2 times a day (17 Jul 2021 17:53)  metoprolol succinate 25 mg oral tablet, extended release: 1 tab(s) orally once a day (17 Jul 2021 17:53)  omeprazole 20 mg oral delayed release capsule: 1 cap(s) orally once a day (17 Jul 2021 17:53)  valsartan-hydrochlorothiazide 160mg-25mg oral tablet: 1 tab(s) orally once a day (17 Jul 2021 17:53)      MEDICATIONS  (STANDING):  aspirin  chewable 81 milliGRAM(s) Oral daily  atorvastatin 20 milliGRAM(s) Oral at bedtime  calcium carbonate 1250 mG  + Vitamin D (OsCal 500 + D) 1 Tablet(s) Oral daily  dextrose 40% Gel 15 Gram(s) Oral once  dextrose 5%. 1000 milliLiter(s) (50 mL/Hr) IV Continuous <Continuous>  dextrose 5%. 1000 milliLiter(s) (100 mL/Hr) IV Continuous <Continuous>  dextrose 50% Injectable 25 Gram(s) IV Push once  dextrose 50% Injectable 12.5 Gram(s) IV Push once  dextrose 50% Injectable 25 Gram(s) IV Push once  enoxaparin Injectable 40 milliGRAM(s) SubCutaneous daily  glucagon  Injectable 1 milliGRAM(s) IntraMuscular once  hydrALAZINE 50 milliGRAM(s) Oral two times a day  hydrochlorothiazide 25 milliGRAM(s) Oral daily  insulin lispro (ADMELOG) corrective regimen sliding scale   SubCutaneous three times a day before meals  metoprolol succinate ER 25 milliGRAM(s) Oral daily  pantoprazole    Tablet 40 milliGRAM(s) Oral before breakfast  valsartan 160 milliGRAM(s) Oral daily      TELEMETRY: 	    ECG:  	  RADIOLOGY:   DIAGNOSTIC TESTING:  [ ] Echocardiogram:  [ ]  Catheterization:  [ ] Stress Test:    OTHER: 	    LABS:	 	    Troponin T, High Sensitivity Result: 14 ng/L (07-18 @ 07:15)  Troponin T, High Sensitivity Result: 16 ng/L (07-17 @ 13:55)  Troponin T, High Sensitivity Result: 15 ng/L (07-17 @ 12:53)                          12.8   9.42  )-----------( 233      ( 18 Jul 2021 07:15 )             38.6     07-19    135  |  96<L>  |  18  ----------------------------<  126<H>  3.6   |  24  |  0.92    Ca    9.7      19 Jul 2021 07:14  Phos  3.6     07-19  Mg     1.80     07-19    TPro  7.5  /  Alb  4.1  /  TBili  0.4  /  DBili  <0.2  /  AST  39<H>  /  ALT  50<H>  /  AlkPhos  67  07-18

## 2021-07-19 NOTE — DISCHARGE NOTE PROVIDER - NSDCFUADDAPPT_GEN_ALL_CORE_FT
If you are in need of a general medicine physician and post-discharge medical follow-up for further care/recommendations you may contact the [facility] for an appointment [phone]

## 2021-07-19 NOTE — DISCHARGE NOTE PROVIDER - HOSPITAL COURSE
87 y/o Icelandic speaking female with h/o HTN, DM-2, HLD, GERD, who presents with left sided substernal chest pain and SOB    chest pain   -no acs, HS trop neg, chest xray unremarkable   -Echo 7/19/21 unremarkable   -Normal NST 7/19/21   -+ cad risk factors  - asa, statin     SOB   -mildly overloaded on exam   -s/p echo  -stop hctz  -lasix 20 mg IVP     HTN  -c/w Hydralazine 50mg BID, Metoprolol succ ER 25 QD, & Valsartan 160mg QD    On ___ this case was reviewed with Krystina Smith NP and Dr. West the patient is medically stable and optimized for discharge. All medications were reviewed and prescriptions were sent to mutually agreed upon pharmacy. The patient agrees to follow up with providers as recommended.

## 2024-05-07 NOTE — ED PROVIDER NOTE - PHYSICAL EXAMINATION
Heather Smalls is a 67 y.o. female who presents today with c/o   Chief Complaint   Patient presents with    Follow-up     Discuss anxiety, check labs, palpitations and osteoporosis    Medicare AW             Assessment/ Plan:       ASSESSMENT AND PLAN    Diagnoses:  1.  Anxiety  Start buspar PRN BID anxiety  Continue xanax as needed  Check labs as a nurse visit    2. Elevated LDL   Check cholesterol as a nurse visit    3.  Osteoporosis  Check phosphorus and mag as a nurse visit     Continue with prolia  Repeat Dexa due 6/2024    4. Palpitations   EKG SB 58 today.   Check labs as a nurse visit   Follow up with cardiology as scheduled.      HM:   Colonoscopy:    Colonoscopy: Done 2019, normal. Repeat in 5 years. Referral placed as requested.    Orders Placed This Encounter   Procedures    TSH     Standing Status:   Future     Standing Expiration Date:   5/7/2025    CBC with Auto Differential     Standing Status:   Future     Standing Expiration Date:   5/7/2025    Comprehensive Metabolic Panel     Standing Status:   Future     Standing Expiration Date:   5/7/2025    Lipid Panel     Standing Status:   Future     Standing Expiration Date:   5/7/2025    [facility] - Simran Magaña MD, General Surgery, [place]     Referral Priority:   Routine     Referral Type:   Eval and Treat     Referral Reason:   Specialty Services Required     Referred to Provider:   Simran Magaña MD     Requested Specialty:   General Surgery     Number of Visits Requested:   1    Visual acuity screening    EKG 12 Lead     Standing Status:   Future     Number of Occurrences:   1     Standing Expiration Date:   5/7/2025     Order Specific Question:   Reason for Exam?     Answer:   Other     Comments:   welcome to medicare       Return in about 4 weeks (around 6/4/2024) for labs.       Subjective:  Heather Smalls is a 67 y.o. pleasant female here today for follow up on palpitations and arthritis, welcome to medicare and wants to discuss 
\"Have you been to the ER, urgent care clinic since your last visit?  Hospitalized since your last visit?\"    NO    “Have you seen or consulted any other health care providers outside of [facility] since your last visit?”    NO     “Have you had a pap smear?”    NO    No cervical cancer screening on file             Click Here for Release of Records Request  
GENERAL: well appearing in no acute distress, non-toxic appearing  HEAD: normocephalic, atraumatic  HEENT: normal conjunctiva, oral mucosa moist, uvula midline, no tonsilar exudates, neck supple, no JVD  CARDIAC: regular rate and rhythm, normal S1S2, no appreciable murmurs, 2+ pulses in UE/LE b/l  PULM: normal breath sounds, clear to ascultation bilaterally, no rales, rhonchi, wheezing  GI: abdomen nondistended, soft, nontender, no guarding, rebound tenderness  : no CVA tenderness b/l, no suprapubic tenderness  NEURO: no focal motor or sensory deficits, CN2-12 intact, normal speech, PERRLA, EOMI, normal gait, AAOx3  MSK: no peripheral edema, no calf tenderness b/l  SKIN: well-perfused, extremities warm, no visible rashes  PSYCH: appropriate mood and affect

## 2024-11-01 NOTE — CONSULT NOTE ADULT - SUBJECTIVE AND OBJECTIVE BOX
CARDIOLOGY CONSULT - Dr. West         HPI:  Patient is a 85 y/o [place] speaking female with h/o HTN, DM-2, HLD, GERD, who presents with left sided substernal chest pain this morning, associated with sob, some radiation to left axillary region. Patient denies fever/chill/ abd pain/urinary symptoms/cough/nausea/vomiting/diaphoresis. Chest pain lasted for about 30 minutes. Also c/o dizziness for the past few days.  EKG LVH, no ischemic ST/T changes. no recent cardiac work up   ROS Otherwise negative         PAST MEDICAL & SURGICAL HISTORY:  HTN (hypertension)    Diabetes mellitus    HLD (hyperlipidemia)    GERD (gastroesophageal reflux disease)    Osteoarthritis    S/P knee replacement            PREVIOUS DIAGNOSTIC TESTING:    [ ] Echocardiogram:   [ ]  Catheterization:  [ ] Stress Test:  	    MEDICATIONS:  Home Medications:  Calcium 500+D oral tablet, chewable: 1 tab(s) orally once a day (17 Jul 2021 17:53)  hydrALAZINE 50 mg oral tablet: 1 tab(s) orally 2 times a day (17 Jul 2021 17:53)  lovastatin 10 mg oral tablet: 1 tab(s) orally once a day (at bedtime) (17 Jul 2021 17:53)  metFORMIN 500 mg oral tablet: 1 tab(s) orally 2 times a day (17 Jul 2021 17:53)  metoprolol succinate 25 mg oral tablet, extended release: 1 tab(s) orally once a day (17 Jul 2021 17:53)  omeprazole 20 mg oral delayed release capsule: 1 cap(s) orally once a day (17 Jul 2021 17:53)  valsartan-hydrochlorothiazide 160mg-25mg oral tablet: 1 tab(s) orally once a day (17 Jul 2021 17:53)      MEDICATIONS  (STANDING):  aspirin  chewable 81 milliGRAM(s) Oral daily  atorvastatin 20 milliGRAM(s) Oral at bedtime  calcium carbonate 1250 mG  + Vitamin D (OsCal 500 + D) 1 Tablet(s) Oral daily  dextrose 40% Gel 15 Gram(s) Oral once  dextrose 5%. 1000 milliLiter(s) (50 mL/Hr) IV Continuous <Continuous>  dextrose 5%. 1000 milliLiter(s) (100 mL/Hr) IV Continuous <Continuous>  dextrose 50% Injectable 25 Gram(s) IV Push once  dextrose 50% Injectable 12.5 Gram(s) IV Push once  dextrose 50% Injectable 25 Gram(s) IV Push once  enoxaparin Injectable 40 milliGRAM(s) SubCutaneous daily  glucagon  Injectable 1 milliGRAM(s) IntraMuscular once  hydrALAZINE 50 milliGRAM(s) Oral two times a day  hydrochlorothiazide 25 milliGRAM(s) Oral daily  insulin lispro (ADMELOG) corrective regimen sliding scale   SubCutaneous three times a day before meals  metoprolol succinate ER 25 milliGRAM(s) Oral daily  pantoprazole    Tablet 40 milliGRAM(s) Oral before breakfast  valsartan 160 milliGRAM(s) Oral daily      FAMILY HISTORY:  No pertinent family history in first degree relatives        SOCIAL HISTORY:    [ x] Non-smoker  [ ] Smoker  [ ] Alcohol    Allergies    No Known Allergies    Intolerances    	    REVIEW OF SYSTEMS:  CONSTITUTIONAL: No fever, weight loss, or fatigue  EYES: No eye pain, visual disturbances, or discharge  ENMT:  No difficulty hearing, tinnitus, vertigo; No sinus or throat pain  NECK: No pain or stiffness  RESPIRATORY:  see hpi   CARDIOVASCULAR: see hpi   GASTROINTESTINAL: No abdominal or epigastric pain. No nausea, vomiting, or hematemesis; No diarrhea or constipation. No melena or hematochezia.  GENITOURINARY: No dysuria, frequency, hematuria, or incontinence  NEUROLOGICAL: No headaches, memory loss, loss of strength, numbness, or tremors  SKIN: No itching, burning, rashes, or lesions   	    [x ] All others negative	  [ ] Unable to obtain    PHYSICAL EXAM:  T(C): 36.8 (07-18-21 @ 05:56), Max: 36.8 (07-17-21 @ 19:43)  HR: 67 (07-18-21 @ 05:56) (67 - 78)  BP: 147/86 (07-18-21 @ 05:56) (146/79 - 149/65)  RR: 18 (07-18-21 @ 05:56) (18 - 18)  SpO2: 100% (07-18-21 @ 05:56) (99% - 100%)  Wt(kg): --  I&O's Summary      Appearance: Normal	  Psychiatry: A & O x 3, Mood & affect appropriate  HEENT:   Normal oral mucosa, PERRL, EOMI	  Lymphatic: No lymphadenopathy  Cardiovascular: Normal S1 S2,RR   Respiratory rhonchi  Gastrointestinal:  Soft, Non-tender, + BS	  Skin: No rashes, No ecchymoses, No cyanosis	  Neurologic: Non-focal  Extremities: trace edema     TELEMETRY: nsr 	    ECG:  	nsr lvh  RADIOLOGY:     from: Xray Chest 1 View- PORTABLE-Urgent (07.17.21 @ 12:33) >  IMPRESSION:  Elevated right hemidiaphragm.    Clear lungs. No pleural effusions or pneumothorax.    Slightly prominent appearing cardiac and mediastinal silhouettes however inaccurately assessed on the projection. Scant aortic arch calcifications.    Trachea midline.    Generalized mild osteopenia.    --- End of Report ---      OTHER: 	  	  LABS:	 	    CARDIAC MARKERS:  Troponin T, High Sensitivity Result: 14 ng/L (07-18 @ 07:15)  Troponin T, High Sensitivity Result: 16 ng/L (07-17 @ 13:55)  Troponin T, High Sensitivity Result: 15 ng/L (07-17 @ 12:53)                                  12.8   9.42  )-----------( 233      ( 18 Jul 2021 07:15 )             38.6     07-18    136  |  97<L>  |  18  ----------------------------<  145<H>  3.7   |  23  |  0.89    Ca    9.9      18 Jul 2021 07:15  Phos  2.6     07-17  Mg     2.10     07-17    TPro  7.5  /  Alb  4.1  /  TBili  0.4  /  DBili  <0.2  /  AST  39<H>  /  ALT  50<H>  /  AlkPhos  67  07-18      proBNP:   Lipid Profile:   HgA1c:   TSH:       
4 = No assist / stand by assistance